# Patient Record
Sex: FEMALE | Race: WHITE | NOT HISPANIC OR LATINO | Employment: FULL TIME | URBAN - METROPOLITAN AREA
[De-identification: names, ages, dates, MRNs, and addresses within clinical notes are randomized per-mention and may not be internally consistent; named-entity substitution may affect disease eponyms.]

---

## 2019-12-12 ENCOUNTER — APPOINTMENT (OUTPATIENT)
Dept: WOMENS IMAGING | Facility: HOSPITAL | Age: 39
End: 2019-12-12

## 2019-12-12 PROCEDURE — 77063 BREAST TOMOSYNTHESIS BI: CPT | Performed by: RADIOLOGY

## 2019-12-12 PROCEDURE — 77067 SCR MAMMO BI INCL CAD: CPT | Performed by: RADIOLOGY

## 2020-12-17 ENCOUNTER — TRANSCRIBE ORDERS (OUTPATIENT)
Dept: PREADMISSION TESTING | Facility: HOSPITAL | Age: 40
End: 2020-12-17

## 2020-12-17 DIAGNOSIS — Z01.818 OTHER SPECIFIED PRE-OPERATIVE EXAMINATION: Primary | ICD-10-CM

## 2021-01-11 ENCOUNTER — APPOINTMENT (OUTPATIENT)
Dept: LAB | Facility: HOSPITAL | Age: 41
End: 2021-01-11

## 2022-06-13 ENCOUNTER — APPOINTMENT (OUTPATIENT)
Dept: WOMENS IMAGING | Facility: HOSPITAL | Age: 42
End: 2022-06-13

## 2022-06-13 PROCEDURE — 77067 SCR MAMMO BI INCL CAD: CPT | Performed by: RADIOLOGY

## 2022-06-13 PROCEDURE — 77063 BREAST TOMOSYNTHESIS BI: CPT | Performed by: RADIOLOGY

## 2022-06-30 ENCOUNTER — APPOINTMENT (OUTPATIENT)
Dept: WOMENS IMAGING | Facility: HOSPITAL | Age: 42
End: 2022-06-30

## 2022-06-30 PROCEDURE — G0279 TOMOSYNTHESIS, MAMMO: HCPCS | Performed by: RADIOLOGY

## 2022-06-30 PROCEDURE — 77062 BREAST TOMOSYNTHESIS BI: CPT | Performed by: RADIOLOGY

## 2022-06-30 PROCEDURE — 77066 DX MAMMO INCL CAD BI: CPT | Performed by: RADIOLOGY

## 2022-06-30 PROCEDURE — 76642 ULTRASOUND BREAST LIMITED: CPT | Performed by: RADIOLOGY

## 2022-09-02 ENCOUNTER — TELEPHONE (OUTPATIENT)
Dept: URGENT CARE | Facility: CLINIC | Age: 42
End: 2022-09-02

## 2022-09-06 ENCOUNTER — TELEPHONE (OUTPATIENT)
Dept: URGENT CARE | Facility: CLINIC | Age: 42
End: 2022-09-06

## 2022-09-06 DIAGNOSIS — N39.0 ACUTE UTI: Primary | ICD-10-CM

## 2022-09-06 RX ORDER — CEFDINIR 300 MG/1
300 CAPSULE ORAL 2 TIMES DAILY
Qty: 14 CAPSULE | Refills: 0 | Status: SHIPPED | OUTPATIENT
Start: 2022-09-06 | End: 2022-09-13

## 2022-09-06 NOTE — TELEPHONE ENCOUNTER
Attempted to call patient about urine culture results, left message to return call. She was put on nitrofurantoin- intermediate on sensitivity testing. Stop nitrofurantoin and start cefdinir. Cefdinir already sent to pharmacy.

## 2022-09-23 PROBLEM — K21.9 GASTROESOPHAGEAL REFLUX DISEASE: Status: ACTIVE | Noted: 2022-09-23

## 2022-09-23 PROBLEM — Z88.9 ALLERGIC CONDITION: Status: ACTIVE | Noted: 2022-09-23

## 2022-09-23 PROBLEM — K59.00 CONSTIPATION: Status: ACTIVE | Noted: 2022-09-23
